# Patient Record
Sex: MALE | NOT HISPANIC OR LATINO | URBAN - METROPOLITAN AREA
[De-identification: names, ages, dates, MRNs, and addresses within clinical notes are randomized per-mention and may not be internally consistent; named-entity substitution may affect disease eponyms.]

---

## 2023-08-27 ENCOUNTER — EMERGENCY (EMERGENCY)
Facility: HOSPITAL | Age: 33
LOS: 1 days | Discharge: HOME | End: 2023-08-27
Attending: EMERGENCY MEDICINE
Payer: COMMERCIAL

## 2023-08-27 VITALS
OXYGEN SATURATION: 99 % | DIASTOLIC BLOOD PRESSURE: 82 MMHG | HEART RATE: 68 BPM | SYSTOLIC BLOOD PRESSURE: 145 MMHG | RESPIRATION RATE: 14 BRPM

## 2023-08-27 LAB
ALBUMIN SERPL ELPH-MCNC: 4.8 G/DL — SIGNIFICANT CHANGE UP (ref 3.3–5)
ALP SERPL-CCNC: 79 U/L — SIGNIFICANT CHANGE UP (ref 40–120)
ALT FLD-CCNC: 60 U/L — HIGH (ref 10–45)
ANION GAP SERPL CALC-SCNC: 14 MMOL/L — SIGNIFICANT CHANGE UP (ref 5–17)
APTT BLD: 40.2 SEC — HIGH (ref 24.5–35.6)
AST SERPL-CCNC: 38 U/L — SIGNIFICANT CHANGE UP (ref 10–40)
BASE EXCESS BLDV CALC-SCNC: 2.6 MMOL/L — SIGNIFICANT CHANGE UP (ref -2–3)
BASOPHILS # BLD AUTO: 0.13 K/UL — SIGNIFICANT CHANGE UP (ref 0–0.2)
BASOPHILS NFR BLD AUTO: 1.3 % — SIGNIFICANT CHANGE UP (ref 0–2)
BILIRUB SERPL-MCNC: 0.4 MG/DL — SIGNIFICANT CHANGE UP (ref 0.2–1.2)
BUN SERPL-MCNC: 8 MG/DL — SIGNIFICANT CHANGE UP (ref 7–23)
CA-I SERPL-SCNC: 1.27 MMOL/L — SIGNIFICANT CHANGE UP (ref 1.15–1.33)
CALCIUM SERPL-MCNC: 9.9 MG/DL — SIGNIFICANT CHANGE UP (ref 8.4–10.5)
CHLORIDE BLDV-SCNC: 101 MMOL/L — SIGNIFICANT CHANGE UP (ref 96–108)
CHLORIDE SERPL-SCNC: 101 MMOL/L — SIGNIFICANT CHANGE UP (ref 96–108)
CO2 BLDV-SCNC: 30 MMOL/L — HIGH (ref 22–26)
CO2 SERPL-SCNC: 23 MMOL/L — SIGNIFICANT CHANGE UP (ref 22–31)
CREAT SERPL-MCNC: 0.96 MG/DL — SIGNIFICANT CHANGE UP (ref 0.5–1.3)
EGFR: 108 ML/MIN/1.73M2 — SIGNIFICANT CHANGE UP
EOSINOPHIL # BLD AUTO: 0.19 K/UL — SIGNIFICANT CHANGE UP (ref 0–0.5)
EOSINOPHIL NFR BLD AUTO: 1.9 % — SIGNIFICANT CHANGE UP (ref 0–6)
ETHANOL SERPL-MCNC: <10 MG/DL — SIGNIFICANT CHANGE UP (ref 0–10)
GAS PNL BLDV: 137 MMOL/L — SIGNIFICANT CHANGE UP (ref 136–145)
GAS PNL BLDV: SIGNIFICANT CHANGE UP
GAS PNL BLDV: SIGNIFICANT CHANGE UP
GLUCOSE BLDV-MCNC: 96 MG/DL — SIGNIFICANT CHANGE UP (ref 70–99)
GLUCOSE SERPL-MCNC: 95 MG/DL — SIGNIFICANT CHANGE UP (ref 70–99)
HCO3 BLDV-SCNC: 28 MMOL/L — SIGNIFICANT CHANGE UP (ref 22–29)
HCT VFR BLD CALC: 46.6 % — SIGNIFICANT CHANGE UP (ref 39–50)
HCT VFR BLDA CALC: 49 % — SIGNIFICANT CHANGE UP (ref 39–51)
HGB BLD CALC-MCNC: 16.3 G/DL — SIGNIFICANT CHANGE UP (ref 12.6–17.4)
HGB BLD-MCNC: 16.2 G/DL — SIGNIFICANT CHANGE UP (ref 13–17)
IMM GRANULOCYTES NFR BLD AUTO: 0.3 % — SIGNIFICANT CHANGE UP (ref 0–0.9)
INR BLD: 1 RATIO — SIGNIFICANT CHANGE UP (ref 0.85–1.18)
LACTATE BLDV-MCNC: 1.6 MMOL/L — SIGNIFICANT CHANGE UP (ref 0.5–2)
LYMPHOCYTES # BLD AUTO: 3.87 K/UL — HIGH (ref 1–3.3)
LYMPHOCYTES # BLD AUTO: 38.1 % — SIGNIFICANT CHANGE UP (ref 13–44)
MCHC RBC-ENTMCNC: 30.3 PG — SIGNIFICANT CHANGE UP (ref 27–34)
MCHC RBC-ENTMCNC: 34.8 GM/DL — SIGNIFICANT CHANGE UP (ref 32–36)
MCV RBC AUTO: 87.3 FL — SIGNIFICANT CHANGE UP (ref 80–100)
MONOCYTES # BLD AUTO: 0.93 K/UL — HIGH (ref 0–0.9)
MONOCYTES NFR BLD AUTO: 9.2 % — SIGNIFICANT CHANGE UP (ref 2–14)
NEUTROPHILS # BLD AUTO: 5.01 K/UL — SIGNIFICANT CHANGE UP (ref 1.8–7.4)
NEUTROPHILS NFR BLD AUTO: 49.2 % — SIGNIFICANT CHANGE UP (ref 43–77)
NRBC # BLD: 0 /100 WBCS — SIGNIFICANT CHANGE UP (ref 0–0)
PCO2 BLDV: 47 MMHG — SIGNIFICANT CHANGE UP (ref 42–55)
PH BLDV: 7.39 — SIGNIFICANT CHANGE UP (ref 7.32–7.43)
PLATELET # BLD AUTO: 278 K/UL — SIGNIFICANT CHANGE UP (ref 150–400)
PO2 BLDV: 51 MMHG — HIGH (ref 25–45)
POTASSIUM BLDV-SCNC: 4.2 MMOL/L — SIGNIFICANT CHANGE UP (ref 3.5–5.1)
POTASSIUM SERPL-MCNC: 4.2 MMOL/L — SIGNIFICANT CHANGE UP (ref 3.5–5.3)
POTASSIUM SERPL-SCNC: 4.2 MMOL/L — SIGNIFICANT CHANGE UP (ref 3.5–5.3)
PROT SERPL-MCNC: 7.5 G/DL — SIGNIFICANT CHANGE UP (ref 6–8.3)
PROTHROM AB SERPL-ACNC: 11 SEC — SIGNIFICANT CHANGE UP (ref 9.5–13)
RBC # BLD: 5.34 M/UL — SIGNIFICANT CHANGE UP (ref 4.2–5.8)
RBC # FLD: 13.1 % — SIGNIFICANT CHANGE UP (ref 10.3–14.5)
SAO2 % BLDV: 86.1 % — SIGNIFICANT CHANGE UP (ref 67–88)
SODIUM SERPL-SCNC: 138 MMOL/L — SIGNIFICANT CHANGE UP (ref 135–145)
TROPONIN T, HIGH SENSITIVITY RESULT: <6 NG/L — SIGNIFICANT CHANGE UP (ref 0–51)
WBC # BLD: 10.16 K/UL — SIGNIFICANT CHANGE UP (ref 3.8–10.5)
WBC # FLD AUTO: 10.16 K/UL — SIGNIFICANT CHANGE UP (ref 3.8–10.5)

## 2023-08-27 PROCEDURE — 70496 CT ANGIOGRAPHY HEAD: CPT | Mod: 26,MA

## 2023-08-27 PROCEDURE — 70450 CT HEAD/BRAIN W/O DYE: CPT | Mod: 26,MA,59

## 2023-08-27 PROCEDURE — 71045 X-RAY EXAM CHEST 1 VIEW: CPT | Mod: 26

## 2023-08-27 PROCEDURE — 99291 CRITICAL CARE FIRST HOUR: CPT

## 2023-08-27 PROCEDURE — 0042T: CPT | Mod: MA

## 2023-08-27 PROCEDURE — 70498 CT ANGIOGRAPHY NECK: CPT | Mod: 26,MA

## 2023-08-27 PROCEDURE — 71275 CT ANGIOGRAPHY CHEST: CPT | Mod: 26,MA

## 2023-08-27 PROCEDURE — 74174 CTA ABD&PLVS W/CONTRAST: CPT | Mod: 26,MA

## 2023-08-27 RX ORDER — SODIUM CHLORIDE 9 MG/ML
1000 INJECTION INTRAMUSCULAR; INTRAVENOUS; SUBCUTANEOUS ONCE
Refills: 0 | Status: COMPLETED | OUTPATIENT
Start: 2023-08-27 | End: 2023-08-27

## 2023-08-27 RX ADMIN — SODIUM CHLORIDE 1000 MILLILITER(S): 9 INJECTION INTRAMUSCULAR; INTRAVENOUS; SUBCUTANEOUS at 22:22

## 2023-08-27 NOTE — CONSULT NOTE ADULT - SUBJECTIVE AND OBJECTIVE BOX
Neurology - Consult Note    Spectra: [phone] ([facility]), [phone] ([facility])    HPI:  33M PMHx HLD    Review of Systems:  INCOMPLETE   CONSTITUTIONAL: No fevers or chills  EYES AND ENT: No visual changes or no throat pain   NECK: No pain or stiffness  RESPIRATORY: No shortness of breath  CARDIOVASCULAR: No chest pain or palpitations  GASTROINTESTINAL: No nausea or vomiting   GENITOURINARY: No dysuria  NEUROLOGICAL: +As stated in HPI above  SKIN: No itching, burning, rashes, or lesions   All other review of systems is negative unless indicated above.    Allergies:  No Known Allergies      PMHx/PSHx/Family Hx: As above, otherwise see below   No pertinent past medical history        Social Hx:  Never smoker; no current use of tobacco, alcohol, or illicit drugs  Lives with ***; occupation ***, baseline functional status is ***    Medications:  MEDICATIONS  (STANDING):    MEDICATIONS  (PRN):      Vitals:  T(C): --  HR: --  BP: --  RR: --  SpO2: --    Physical Examination: INCOMPLETE  General - non-toxic appearing male/female in no acute distress  Cardiovascular - peripheral pulses palpable, no edema  Respiratory - breathing comfortably with no increased work of breathing    Neurologic Exam:  Mental status - Awake, Alert, Oriented to person, place, and time. Speech fluent, repetition and naming intact. Follows simple and complex commands. Attention/concentration, recent and remote memory (including registration 3/3 and recall 3/3), and fund of knowledge intact    Cranial nerves - PERRLA (4mm -> 3mm b/l), VFF, EOMI, face sensation (V1-V3) intact b/l, facial strength intact without asymmetry b/l, hearing intact b/l, palate with symmetric elevation, trapezius OR sternocleidomastiod 5/5 strength b/l, tongue midline on protrusion with full lateral movement    Motor - Normal bulk and tone throughout. No pronator drift.    Strength testing            Deltoid      Biceps      Triceps     Wrist Extension    Wrist Flexion     Interossei         R            5                 5               5                     5                              5                        5                 5  L             5                 5               5                     5                              5                        5                 5              Hip Flexion    Hip Extension    Knee Flexion    Knee Extension    Dorsiflexion    Plantar Flexion  R              5                         5                       5                           5                            5                          5  L              5                         5                        5                           5                            5                          5    Sensation - Light touch/temperature OR pain/vibration intact throughout    DTR's -             Biceps      Triceps     Brachioradialis      Patellar    Ankle    Toes/plantar response  R             2+             2+                  2+                       2+            2+                 Down  L              2+             2+                 2+                        2+           2+                 Down    Coordination - Finger to Nose intact b/l. No tremors appreciated    Gait and station - Normal casual gait. Romberg (-)    Labs:          CAPILLARY BLOOD GLUCOSE      POCT Blood Glucose.: 96 mg/dL (27 Aug 2023 21:46)          CSF:                  Radiology:     Neurology - Consult Note    Spectra: [phone] ([facility]), [phone] ([facility])    HPI:  33M PMHx HLD presenting with "unresponsiveness." Per wife at bedside, pt works as a [employer]  and came home around 6pm. Pt was getting ready to eat dinner when wife left him at the dinner table to take care of something elsewhere in the house. At that time, pt decided to go to bed to lie down. Wife found the patient in bed complaining of "chest pain and nausea" and then "closed his eyes and became unresponsive." Denies tongue biting, urinary incontinence, and no "shaking activity." Wife called EMS and pt brought to ED. Code stroke activated for unresponsiveness. LKN 1930 8/27/23. NIHSS 26. mRS 0.      Review of Systems: unable to obtain due to pt mental status     Allergies:  No Known Allergies    PMHx/PSHx/Family Hx: As above, otherwise see below   No pertinent past medical history    Social Hx:  No current use of tobacco, alcohol, or illicit drugs  Lives with wife at home. Works as [employer] .     Medications:  MEDICATIONS  (STANDING):    MEDICATIONS  (PRN):    Vitals:  T(C): --  HR: --  BP: --  RR: --  SpO2: --    Physical Examination:  General - non-toxic appearing male in no acute distress  Cardiovascular - peripheral pulses palpable, no edema  Respiratory - breathing comfortably with no increased work of breathing    Neurologic Exam:  Mental status - Eyes open but does not respond to voice, tactile or noxious stimuli. Nonverbal but does follow commands intermittently (thumbs up).     Cranial nerves - PERRLA (4mm -> 3mm b/l), no BTT, VOR intact, no overt facial asymmetry    Motor - Normal bulk but increased tone in bilateral lower extremities (appears voluntary). No pronator drift. No movement in all 4 extremities even with noxious stimuli, however, when placed both arms above pt's face, arms do not hit face. Consistently misses.     Sensation - Does not grimace to noxious stimuli    DTR's -             Biceps      Triceps     Brachioradialis      Patellar    Ankle    Toes/plantar response  R             2+             2+             2+              2+ brisk       2+                 Down  L              2+             2+            2+               2+ brisk       2+                 Down    Coordination - unable to assess due to pt mental status     Gait and station - unable to assess due to pt mental status       Labs:                        16.2   10.16 )-----------( 278      ( 27 Aug 2023 22:02 )             46.6     08-27    138  |  101  |  8   ----------------------------<  95  4.2   |  23  |  0.96    Ca    9.9      27 Aug 2023 22:02    TPro  7.5  /  Alb  4.8  /  TBili  0.4  /  DBili  x   /  AST  38  /  ALT  60<H>  /  AlkPhos  79  08-27    PT/INR - ( 27 Aug 2023 22:02 )   PT: 11.0 sec;   INR: 1.00 ratio      PTT - ( 27 Aug 2023 22:02 )  PTT:40.2 sec    Urinalysis Basic - ( 27 Aug 2023 22:02 )  Color: x / Appearance: x / SG: x / pH: x  Gluc: 95 mg/dL / Ketone: x  / Bili: x / Urobili: x   Blood: x / Protein: x / Nitrite: x   Leuk Esterase: x / RBC: x / WBC x   Sq Epi: x / Non Sq Epi: x / Bacteria: x    CAPILLARY BLOOD GLUCOSE  POCT Blood Glucose.: 96 mg/dL (27 Aug 2023 21:46)      Radiology:  < from: CT Angio Neck Stroke Protocol w/ IV Cont (08.27.23 @ 22:52) >  IMPRESSION:    CT PERFUSION BRAIN:  No evidence of territorial infarct or ischemia.    CTA HEAD:  No flow-limiting stenosis, occlusion or aneurysm.    CTA NECK:  No flow-limiting stenosis, occlusion or dissection.    < end of copied text >  < from: CT Brain Stroke Protocol (08.27.23 @ 22:27) >  FINDINGS:    BRAIN: No apparent acute infarct, hemorrhage or mass. No hydrocephalus.    CALVARIUM: No skull fracture or concerning osseous lesions.    EXTRACRANIAL SOFT TISSUES: No acute findings.    VISUALIZED PORTIONS OF THE PARANASAL SINUSES AND MASTOID AIR CELLS: No   air fluid levels.    IMPRESSION:  No acute intracranial findings.  < end of copied text >

## 2023-08-27 NOTE — CONSULT NOTE ADULT - NSCONSULTADDITIONALINFOA_GEN_ALL_CORE
ATTENDING ADDENDUM:  Patient was discharged prior to case being seen or discussed with me. Please refer to resident note for assessment/plan. Please call neurology [phone] if patient returns to the hospital.

## 2023-08-27 NOTE — CONSULT NOTE ADULT - ASSESSMENT
33M PMHx HLD presenting with "unresponsiveness." Pt is an [employer]  who works night shifts. Upon arrival to ED, pt found to have eyes open, staring blankly forward w/o blinking and not moving any of his extremities even with noxious stimuli. However, when arms raised above face, it does not hit pt's face. Pupils reactive to light and equal throughout the code. During reassessment after code stroke, pt still nonverbal, however, able to track examiner with full EOM and follow commands such as thumbs up although intermittently. Vitals stable. CBC and CMP WNL. CTH, CTA, and CTP negative.     LKN 1930 8/27/23  NIHSS 26  mRS 0      IMPRESSION: Sudden onset of "unresponsiveness" but with eyes open and intermittently tracking/following commands likely 2/2 seizure-like activity vs. PNES vs. catatonic behavior. Low suspicion for acute ischemic stroke.     RECOMMENDATIONS:  [] Admit to medicine if possible  [] Behavioral health/psych consult  [] vEEG  [] Depending on vEEG results, will consider further imaging including MRI   [] Monitor off AEDs  [] Check Utox. UA, CK, and lactate  [] Neurochecks and vitals per routine  [] If patient has a convulsion, please document accurately the length of the episode, and specifically what the patient was doing paying attention to eye opening vs closure, gaze deviation, shaking of extremities, tongue bite, urinary incontinence, any derangement of vital signs.   [] Rest of care per primary team      Case to be seen and d/w Neurology attending.    33M PMHx HLD presenting with "unresponsiveness." Pt is an [employer]  who works night shifts. Upon arrival to ED, pt found to have eyes open, staring blankly forward w/o blinking and not moving any of his extremities even with noxious stimuli. However, when arms raised above face, it does not hit pt's face. Pupils reactive to light and equal throughout the code. During reassessment after code stroke, pt still nonverbal, however, able to track examiner with full EOM and follow commands such as thumbs up although intermittently. Vitals stable. CBC and CMP WNL. CTH, CTA, and CTP negative. Pt not a candidate for tenecteplase due to non-disabling deficits and low suspicion of stroke. Pt not a candidate for thrombectomy due to no LVO seen on CTA H/N.     RAJN 1930 8/27/23  NIHSS 26  mRS 0      IMPRESSION: Sudden onset of "unresponsiveness" but with eyes open and intermittently tracking/following commands likely 2/2 seizure-like activity vs. PNES vs. catatonic behavior. Low suspicion for acute ischemic stroke.     RECOMMENDATIONS:  [] Admit to medicine if possible  [] Behavioral health/psych consult  [] vEEG  [] Depending on vEEG results, will consider further imaging including MRI   [] Monitor off AEDs  [] Check Utox. UA, CK, and lactate  [] Neurochecks and vitals per routine  [] If patient has a convulsion, please document accurately the length of the episode, and specifically what the patient was doing paying attention to eye opening vs closure, gaze deviation, shaking of extremities, tongue bite, urinary incontinence, any derangement of vital signs.   [] Rest of care per primary team      Case to be seen and d/w Neurology attending.

## 2023-08-28 VITALS
TEMPERATURE: 98 F | RESPIRATION RATE: 17 BRPM | SYSTOLIC BLOOD PRESSURE: 116 MMHG | OXYGEN SATURATION: 98 % | DIASTOLIC BLOOD PRESSURE: 81 MMHG | HEART RATE: 61 BPM

## 2023-08-28 LAB
APPEARANCE UR: CLEAR — SIGNIFICANT CHANGE UP
BILIRUB UR-MCNC: NEGATIVE — SIGNIFICANT CHANGE UP
COLOR SPEC: COLORLESS — SIGNIFICANT CHANGE UP
DIFF PNL FLD: NEGATIVE — SIGNIFICANT CHANGE UP
GLUCOSE UR QL: NEGATIVE — SIGNIFICANT CHANGE UP
KETONES UR-MCNC: NEGATIVE — SIGNIFICANT CHANGE UP
LEUKOCYTE ESTERASE UR-ACNC: NEGATIVE — SIGNIFICANT CHANGE UP
NITRITE UR-MCNC: NEGATIVE — SIGNIFICANT CHANGE UP
PH UR: 7 — SIGNIFICANT CHANGE UP (ref 5–8)
PROT UR-MCNC: NEGATIVE — SIGNIFICANT CHANGE UP
SP GR SPEC: 1.03 — HIGH (ref 1.01–1.02)
UROBILINOGEN FLD QL: NEGATIVE — SIGNIFICANT CHANGE UP

## 2023-08-28 PROCEDURE — 85610 PROTHROMBIN TIME: CPT

## 2023-08-28 PROCEDURE — 84484 ASSAY OF TROPONIN QUANT: CPT

## 2023-08-28 PROCEDURE — 82803 BLOOD GASES ANY COMBINATION: CPT

## 2023-08-28 PROCEDURE — 80307 DRUG TEST PRSMV CHEM ANLYZR: CPT

## 2023-08-28 PROCEDURE — 99291 CRITICAL CARE FIRST HOUR: CPT | Mod: 25

## 2023-08-28 PROCEDURE — 85730 THROMBOPLASTIN TIME PARTIAL: CPT

## 2023-08-28 PROCEDURE — 83605 ASSAY OF LACTIC ACID: CPT

## 2023-08-28 PROCEDURE — 84132 ASSAY OF SERUM POTASSIUM: CPT

## 2023-08-28 PROCEDURE — 71045 X-RAY EXAM CHEST 1 VIEW: CPT

## 2023-08-28 PROCEDURE — 81003 URINALYSIS AUTO W/O SCOPE: CPT

## 2023-08-28 PROCEDURE — 82435 ASSAY OF BLOOD CHLORIDE: CPT

## 2023-08-28 PROCEDURE — 0042T: CPT | Mod: MA

## 2023-08-28 PROCEDURE — 85014 HEMATOCRIT: CPT

## 2023-08-28 PROCEDURE — 82330 ASSAY OF CALCIUM: CPT

## 2023-08-28 PROCEDURE — 82947 ASSAY GLUCOSE BLOOD QUANT: CPT

## 2023-08-28 PROCEDURE — 85025 COMPLETE CBC W/AUTO DIFF WBC: CPT

## 2023-08-28 PROCEDURE — 71275 CT ANGIOGRAPHY CHEST: CPT | Mod: MA

## 2023-08-28 PROCEDURE — 70498 CT ANGIOGRAPHY NECK: CPT | Mod: MA

## 2023-08-28 PROCEDURE — 93005 ELECTROCARDIOGRAM TRACING: CPT

## 2023-08-28 PROCEDURE — 84295 ASSAY OF SERUM SODIUM: CPT

## 2023-08-28 PROCEDURE — 70450 CT HEAD/BRAIN W/O DYE: CPT | Mod: MA

## 2023-08-28 PROCEDURE — 80053 COMPREHEN METABOLIC PANEL: CPT

## 2023-08-28 PROCEDURE — 70496 CT ANGIOGRAPHY HEAD: CPT | Mod: MA

## 2023-08-28 PROCEDURE — 74174 CTA ABD&PLVS W/CONTRAST: CPT | Mod: MA

## 2023-08-28 PROCEDURE — 82962 GLUCOSE BLOOD TEST: CPT

## 2023-08-28 PROCEDURE — 85018 HEMOGLOBIN: CPT

## 2023-08-28 NOTE — ED ADULT NURSE NOTE - NSFALLRISKINTERV_ED_ALL_ED

## 2023-08-28 NOTE — ED PROVIDER NOTE - SHIFT CHANGE DETAILS
Danny Roth MD FACEP note of transfer at the usual time of sign out: Receiving team will follow up on labs, analgesia, any clinical imaging, reassess and disposition as clinically indicated.  Details of patient and plan conveyed to receiving physician and conveyed back for understanding.  There were no questions at this time about the patient's status, disposition, and plan. Patient's care to be taken over by receiving physician at this time, all decisions regarding the progression of care will be made at their discretion.

## 2023-08-28 NOTE — ED PROVIDER NOTE - CLINICAL SUMMARY MEDICAL DECISION MAKING FREE TEXT BOX
Danny Roth MD, FACEP: In this physician's medical judgement based on clinical history and physical exam the patient's signs and symptoms lead to differential diagnoses which includes but is not limited to: PNES, focal seizure, TGA    Historical features, symptoms, and clinical exam not consistent with: sepsis    Labs were ordered and independently reviewed by me.  EKG was ordered and independently reviewed by me.  Imaging was ordered and reviewed by me.      Appropriate medications for the patient's presenting complaints were ordered, and effects were reassessed.   History assisted by patient's wife  Escalation to admission/observation was considered.    Will follow up on labs, therapeutics, imaging, reassess and disposition as clinically indicated.  *The above represents an initial assessment/impression. Please refer to my progress notes below for potential changes in patient clinical course*

## 2023-08-28 NOTE — ED PROVIDER NOTE - PATIENT PORTAL LINK FT
You can access the FollowMyHealth Patient Portal offered by [facility] by registering at the following website: [email or web address]. By joining [employer]’s FollowMyHealth portal, you will also be able to view your health information using other applications (apps) compatible with our system.

## 2023-08-28 NOTE — CHART NOTE - NSCHARTNOTEFT_GEN_A_CORE
Patient case discussed with Epileptologist Dr. Mujica who recommended outpatient follow up for patient. Patient does not need to be urgently admitted to EMU for epilepsy workup at this time. Patient case discussed with Epileptologist Dr. Mujica after discharge from ER, who agreed at this point with outpatient follow up/EEG for patient.

## 2023-08-28 NOTE — ED PROVIDER NOTE - NSFOLLOWUPINSTRUCTIONS_ED_ALL_ED_FT
You were seen in the Emergency Department for altered mental status.    You may take 975 mg Tylenol (acetaminophen) every six hours as needed for pain.    Follow up with your primary care provider within 3-5 days.    If you have fever, chills, nausea, vomiting, new or worsening pain, or if you have any new symptoms return to the Emergency Department.

## 2023-08-28 NOTE — ED ADULT NURSE REASSESSMENT NOTE - NS ED NURSE REASSESS COMMENT FT1
Pt now A&Ox3 and speaking. On exam, neg facial drop, neg slurred speech, PERRL 3mm. Pt noted to have a weaker R sided  & drift. MD Chandra made aware of change in neurological assessment. See stroke flowsheet.

## 2023-08-28 NOTE — ED ADULT NURSE NOTE - OBJECTIVE STATEMENT
32y/o M coming to the ED c/o chest pain. As per wife, found the patient in bed complaining of "chest pain and nausea" and then "closed his eyes and became unresponsive." Denies tongue biting, urinary incontinence, and no "shaking activity." Code stroke activated, pt to Ctscan. On exam, pt is mute & not following commands. Pt is breathing spontaneously, saturating 98% RA. Heart monitor placed, NSR. Abdomen soft, nontender, nondistended. IV placed, labs collected and sent. VSS.

## 2023-08-28 NOTE — ED PROVIDER NOTE - NSFOLLOWUPCLINICS_GEN_ALL_ED_FT
[facility]  Neurology  [address] [place] [address] [address] [place] [address]  Phone: [phone]  Fax:

## 2023-08-28 NOTE — ED PROVIDER NOTE - OBJECTIVE STATEMENT
Patient presents from waiting room in wheelchair to [facility] secondary to reporting to the emergency department with chest pain and unresponsiveness. Patient was eating dinner with his family and said he had some chest pain and immediately became unresponsive. Patient was brought in by private vehicle and carried to the car by his family per the wife. Patient is not vocalizing, staring and history limited due to urgent need for intervention.

## 2023-08-28 NOTE — ED PROVIDER NOTE - PHYSICAL EXAMINATION
blankly staring  + corneal reflex  No resting tremor  no seizure like activity  non-tachycardic  non-tachypneic  bilateral breath sounds  soft, non-distended   no gross deformity of extremities, no asymmetry   patient not following commands  sat up to 45-90degrees when RN attempted CPR compressions due to concern for a lack of pulse

## 2023-08-28 NOTE — ED PROVIDER NOTE - PROGRESS NOTE DETAILS
Marc Chandra DO:Patient signed out to me.  Patient reassessed however at this time patient is now brought back to baseline, wife agrees as well, patient cannot offer any insight as to why symptoms occurred, will have patient follow-up with neurologist.  No concern for emergency at this time. No concern for encephalitis meningitis no concern for drugOr alcohol induced AMS at this time, patient is stable for discharge at this time.

## 2025-09-10 PROBLEM — Z00.00 ENCOUNTER FOR PREVENTIVE HEALTH EXAMINATION: Status: ACTIVE | Noted: 2025-09-10

## 2025-09-18 ENCOUNTER — APPOINTMENT (OUTPATIENT)
Dept: ORTHOPEDIC SURGERY | Age: 35
End: 2025-09-18
Payer: MEDICAID

## 2025-09-18 VITALS — WEIGHT: 160 LBS | BODY MASS INDEX: 24.25 KG/M2 | HEIGHT: 68 IN

## 2025-09-18 DIAGNOSIS — M79.18 MYALGIA, OTHER SITE: ICD-10-CM

## 2025-09-18 DIAGNOSIS — M54.12 RADICULOPATHY, CERVICAL REGION: ICD-10-CM

## 2025-09-18 PROCEDURE — 73030 X-RAY EXAM OF SHOULDER: CPT | Mod: RT

## 2025-09-18 PROCEDURE — 72050 X-RAY EXAM NECK SPINE 4/5VWS: CPT

## 2025-09-18 PROCEDURE — 20553 NJX 1/MLT TRIGGER POINTS 3/>: CPT

## 2025-09-18 PROCEDURE — 99204 OFFICE O/P NEW MOD 45 MIN: CPT | Mod: 25
